# Patient Record
Sex: FEMALE | Race: WHITE | Employment: FULL TIME
[De-identification: names, ages, dates, MRNs, and addresses within clinical notes are randomized per-mention and may not be internally consistent; named-entity substitution may affect disease eponyms.]

---

## 2017-02-20 ENCOUNTER — RECORDS - HEALTHEAST (OUTPATIENT)
Dept: ADMINISTRATIVE | Facility: OTHER | Age: 52
End: 2017-02-20

## 2017-02-20 LAB
LAB AP CHARGES (HE HISTORICAL CONVERSION): NORMAL
PATH REPORT.COMMENTS IMP SPEC: NORMAL
PATH REPORT.FINAL DX SPEC: NORMAL
PATH REPORT.GROSS SPEC: NORMAL
PATH REPORT.MICROSCOPIC SPEC OTHER STN: NORMAL
PATH REPORT.RELEVANT HX SPEC: NORMAL
RESULT FLAG (HE HISTORICAL CONVERSION): NORMAL

## 2018-07-10 ENCOUNTER — HEALTH MAINTENANCE LETTER (OUTPATIENT)
Age: 53
End: 2018-07-10

## 2019-01-22 ENCOUNTER — PATIENT OUTREACH (OUTPATIENT)
Dept: CARE COORDINATION | Facility: CLINIC | Age: 54
End: 2019-01-22

## 2019-04-19 NOTE — TELEPHONE ENCOUNTER
RECORDS RECEIVED FROM: Scoliosis, no previous surgery   DATE RECEIVED: Apr 30, 2019    NOTES STATUS DETAILS   OFFICE NOTE from referring provider N/A    OFFICE NOTE from other specialist Care Everywhere PT Assessment 4/10/15   DISCHARGE SUMMARY from hospital N/A    DISCHARGE REPORT from the ER N/A    OPERATIVE REPORT N/A    MEDICATION LIST Care Everywhere    IMPLANT RECORD/STICKER N/A    LABS     CBC/DIFF N/A    CULTURES N/A    INJECTIONS DONE IN RADIOLOGY N/A    MRI N/A    CT SCAN N/A    XRAYS (IMAGES & REPORTS) N/A    TUMOR     PATHOLOGY  Slides & report N/A

## 2019-04-20 ASSESSMENT — ENCOUNTER SYMPTOMS
NECK PAIN: 0
BACK PAIN: 1
MUSCLE CRAMPS: 0
MUSCLE WEAKNESS: 0
BACK PAIN: 1
NECK PAIN: 0
BACK PAIN: 1
MUSCLE WEAKNESS: 0
ARTHRALGIAS: 0
ARTHRALGIAS: 0
JOINT SWELLING: 0
MUSCLE CRAMPS: 0
ARTHRALGIAS: 0
MUSCLE CRAMPS: 0
MYALGIAS: 1
MUSCLE WEAKNESS: 0
JOINT SWELLING: 0
ARTHRALGIAS: 0
MYALGIAS: 1
STIFFNESS: 0
MUSCLE WEAKNESS: 0
MYALGIAS: 1
STIFFNESS: 0
NECK PAIN: 0
JOINT SWELLING: 0
MUSCLE CRAMPS: 0
BACK PAIN: 1
STIFFNESS: 0
STIFFNESS: 0
NECK PAIN: 0
MYALGIAS: 1
JOINT SWELLING: 0

## 2019-04-20 ASSESSMENT — PATIENT HEALTH QUESTIONNAIRE - PHQ9
10. IF YOU CHECKED OFF ANY PROBLEMS, HOW DIFFICULT HAVE THESE PROBLEMS MADE IT FOR YOU TO DO YOUR WORK, TAKE CARE OF THINGS AT HOME, OR GET ALONG WITH OTHER PEOPLE: NOT DIFFICULT AT ALL
SUM OF ALL RESPONSES TO PHQ QUESTIONS 1-9: 5
SUM OF ALL RESPONSES TO PHQ QUESTIONS 1-9: 5
10. IF YOU CHECKED OFF ANY PROBLEMS, HOW DIFFICULT HAVE THESE PROBLEMS MADE IT FOR YOU TO DO YOUR WORK, TAKE CARE OF THINGS AT HOME, OR GET ALONG WITH OTHER PEOPLE: NOT DIFFICULT AT ALL
SUM OF ALL RESPONSES TO PHQ QUESTIONS 1-9: 5
SUM OF ALL RESPONSES TO PHQ QUESTIONS 1-9: 5

## 2019-04-20 ASSESSMENT — ANXIETY QUESTIONNAIRES
7. FEELING AFRAID AS IF SOMETHING AWFUL MIGHT HAPPEN: NOT AT ALL
GAD7 TOTAL SCORE: 3
7. FEELING AFRAID AS IF SOMETHING AWFUL MIGHT HAPPEN: NOT AT ALL
4. TROUBLE RELAXING: SEVERAL DAYS
7. FEELING AFRAID AS IF SOMETHING AWFUL MIGHT HAPPEN: NOT AT ALL
1. FEELING NERVOUS, ANXIOUS, OR ON EDGE: SEVERAL DAYS
2. NOT BEING ABLE TO STOP OR CONTROL WORRYING: NOT AT ALL
GAD7 TOTAL SCORE: 3
4. TROUBLE RELAXING: SEVERAL DAYS
7. FEELING AFRAID AS IF SOMETHING AWFUL MIGHT HAPPEN: NOT AT ALL
GAD7 TOTAL SCORE: 3
GAD7 TOTAL SCORE: 3
6. BECOMING EASILY ANNOYED OR IRRITABLE: SEVERAL DAYS
GAD7 TOTAL SCORE: 3
1. FEELING NERVOUS, ANXIOUS, OR ON EDGE: SEVERAL DAYS
3. WORRYING TOO MUCH ABOUT DIFFERENT THINGS: NOT AT ALL
6. BECOMING EASILY ANNOYED OR IRRITABLE: SEVERAL DAYS
3. WORRYING TOO MUCH ABOUT DIFFERENT THINGS: NOT AT ALL
5. BEING SO RESTLESS THAT IT IS HARD TO SIT STILL: NOT AT ALL
5. BEING SO RESTLESS THAT IT IS HARD TO SIT STILL: NOT AT ALL
2. NOT BEING ABLE TO STOP OR CONTROL WORRYING: NOT AT ALL
GAD7 TOTAL SCORE: 3

## 2019-04-21 ASSESSMENT — ANXIETY QUESTIONNAIRES
GAD7 TOTAL SCORE: 3
GAD7 TOTAL SCORE: 3

## 2019-04-21 ASSESSMENT — PATIENT HEALTH QUESTIONNAIRE - PHQ9
SUM OF ALL RESPONSES TO PHQ QUESTIONS 1-9: 5
SUM OF ALL RESPONSES TO PHQ QUESTIONS 1-9: 5

## 2019-04-22 ENCOUNTER — VIRTUAL VISIT (OUTPATIENT)
Dept: INTERNAL MEDICINE | Facility: CLINIC | Age: 54
End: 2019-04-22
Payer: COMMERCIAL

## 2019-04-22 DIAGNOSIS — Z00.00 HEALTH CARE MAINTENANCE: Primary | ICD-10-CM

## 2019-04-22 NOTE — PROGRESS NOTES
Health Maintenance:  Do you have a PCP? Yes  When was your last visit with your PCP? 3 years  When was your last eye exam? 7/2018  Have you ever had a colonoscopy? Yes   If yes, when? 2016  Have you ever had any polyps removed? No    30+ Pack Year Smoking History:  Have you ever had a chest CT to screen for cancer? No    If Female: (25 years old+) When was your last pap smear ?    Have you ever had abnormal pap smear results?     As part of your visit we will set up a DEXA scan which will measure your body composition. We have a few questions that need to be answered before we can schedule this scan:   What is your approximate weight? 110   Have you ever had a DEXA scan within the past 2 years? No   Will you have any other imaging studies with contrast (x-ray, CT scan) within 7  days of this appointment? No   Have you had any spine or hip surgery? No   Do you take any vitamins that contain calcium or antacids with calcium? No    If yes, stop taking 24 hours prior to visit.     Goals for the Visit:  1. Thorough Comprehensive Preventative Exam  2.Concerned about Iron level, has been an issue in the past  3. Lower back pain has been impacting her life in the past 3 years,   4. Sleep Concerns, has seen sleep med in the past    Pertinent past Medical/Family and Social HX:   Pertinent sx that desire are addressed with this visit:     Answers for HPI/ROS submitted by the patient on 4/20/2019   If you checked off any problems, how difficult have these problems made it for you to do your work, take care of things at home, or get along with other people?: Not difficult at all  PHQ9 TOTAL SCORE: 5  DESIREE 7 TOTAL SCORE: 3  General Symptoms: No  Skin Symptoms: No  HENT Symptoms: No  EYE SYMPTOMS: No  HEART SYMPTOMS: No  LUNG SYMPTOMS: No  INTESTINAL SYMPTOMS: No  URINARY SYMPTOMS: No  GYNECOLOGIC SYMPTOMS: No  BREAST SYMPTOMS: No  SKELETAL SYMPTOMS: Yes  BLOOD SYMPTOMS: No  NERVOUS SYSTEM SYMPTOMS: No  MENTAL HEALTH SYMPTOMS:  No  Back pain: Yes  Muscle aches: Yes  Neck pain: No  Swollen joints: No  Joint pain: No  Bone pain: No  Muscle cramps: No  Muscle weakness: No  Joint stiffness: No  Bone fracture: No      Instructions prior to appointment:   1. Fast beginning at 10 pm for lab appointment  2. If your preventive care assessment package includes a Fitness Assessment, please bring athletic shoes. Complementary [facility] fitness attire is provided and yours to keep.  3. If eye exam, eyes may be dilated, it will last 4-6 hours, may want to bring sunglasses.   4. May bring laptop or other work materials for use during downtime.   5. You will receive an email about 3 days prior to your visit with a final itinerary, menu selections for the complementary breakfast and lunch and instructions for the visit.     Complimentary  Parking provided. Drop off car in front of [facility], take the patient elevators to the [facility] clinic. When you enter in the lobby, identify yourself as an Executive Health [atient and you will be escorted up to the clinic.   If questions arise prior to your appointment please contact the clinic at [phone].

## 2019-04-23 DIAGNOSIS — M41.9 SCOLIOSIS: Primary | ICD-10-CM

## 2019-04-30 ENCOUNTER — OFFICE VISIT (OUTPATIENT)
Dept: AUDIOLOGY | Facility: CLINIC | Age: 54
End: 2019-04-30
Payer: COMMERCIAL

## 2019-04-30 ENCOUNTER — PRE VISIT (OUTPATIENT)
Dept: ORTHOPEDICS | Facility: CLINIC | Age: 54
End: 2019-04-30

## 2019-04-30 ENCOUNTER — OFFICE VISIT (OUTPATIENT)
Dept: INTERNAL MEDICINE | Facility: CLINIC | Age: 54
End: 2019-04-30
Payer: COMMERCIAL

## 2019-04-30 ENCOUNTER — OFFICE VISIT (OUTPATIENT)
Dept: GASTROENTEROLOGY | Facility: CLINIC | Age: 54
End: 2019-04-30
Payer: COMMERCIAL

## 2019-04-30 ENCOUNTER — OFFICE VISIT (OUTPATIENT)
Dept: DERMATOLOGY | Facility: CLINIC | Age: 54
End: 2019-04-30
Payer: COMMERCIAL

## 2019-04-30 ENCOUNTER — ANCILLARY PROCEDURE (OUTPATIENT)
Dept: BONE DENSITY | Facility: CLINIC | Age: 54
End: 2019-04-30
Payer: COMMERCIAL

## 2019-04-30 ENCOUNTER — OFFICE VISIT (OUTPATIENT)
Dept: ORTHOPEDICS | Facility: CLINIC | Age: 54
End: 2019-04-30
Payer: COMMERCIAL

## 2019-04-30 ENCOUNTER — ANCILLARY PROCEDURE (OUTPATIENT)
Dept: GENERAL RADIOLOGY | Facility: CLINIC | Age: 54
End: 2019-04-30
Attending: ORTHOPAEDIC SURGERY
Payer: COMMERCIAL

## 2019-04-30 ENCOUNTER — APPOINTMENT (OUTPATIENT)
Dept: INTERNAL MEDICINE | Facility: CLINIC | Age: 54
End: 2019-04-30
Payer: COMMERCIAL

## 2019-04-30 VITALS — HEIGHT: 59 IN | WEIGHT: 110 LBS | BODY MASS INDEX: 22.18 KG/M2

## 2019-04-30 VITALS
TEMPERATURE: 97.7 F | SYSTOLIC BLOOD PRESSURE: 117 MMHG | HEART RATE: 67 BPM | HEIGHT: 59 IN | DIASTOLIC BLOOD PRESSURE: 75 MMHG | WEIGHT: 110 LBS | RESPIRATION RATE: 16 BRPM | OXYGEN SATURATION: 99 % | BODY MASS INDEX: 22.18 KG/M2

## 2019-04-30 DIAGNOSIS — M41.20 OTHER IDIOPATHIC SCOLIOSIS, UNSPECIFIED SPINAL REGION: ICD-10-CM

## 2019-04-30 DIAGNOSIS — Z71.84 TRAVEL ADVICE ENCOUNTER: ICD-10-CM

## 2019-04-30 DIAGNOSIS — Z00.00 ROUTINE GENERAL MEDICAL EXAMINATION AT A HEALTH CARE FACILITY: Primary | ICD-10-CM

## 2019-04-30 DIAGNOSIS — Z01.10 ENCOUNTER FOR EXAMINATION OF EARS AND HEARING WITHOUT ABNORMAL FINDINGS: Primary | ICD-10-CM

## 2019-04-30 DIAGNOSIS — Z71.3 NUTRITIONAL COUNSELING: Primary | ICD-10-CM

## 2019-04-30 DIAGNOSIS — Z00.00 HEALTHCARE MAINTENANCE: Primary | ICD-10-CM

## 2019-04-30 DIAGNOSIS — F51.04 INSOMNIA, PSYCHOPHYSIOLOGICAL: ICD-10-CM

## 2019-04-30 DIAGNOSIS — D23.9 DERMATOFIBROMA: ICD-10-CM

## 2019-04-30 DIAGNOSIS — Z00.00 ENCOUNTER FOR PREVENTIVE HEALTH EXAMINATION: Primary | ICD-10-CM

## 2019-04-30 DIAGNOSIS — Z12.83 SKIN CANCER SCREENING: Primary | ICD-10-CM

## 2019-04-30 DIAGNOSIS — Z71.82 EXERCISE COUNSELING: Primary | ICD-10-CM

## 2019-04-30 DIAGNOSIS — M41.25 OTHER IDIOPATHIC SCOLIOSIS, THORACOLUMBAR REGION: Primary | ICD-10-CM

## 2019-04-30 DIAGNOSIS — D50.0 IRON DEFICIENCY ANEMIA DUE TO CHRONIC BLOOD LOSS: ICD-10-CM

## 2019-04-30 DIAGNOSIS — Z00.00 VISIT FOR PREVENTIVE HEALTH EXAMINATION: ICD-10-CM

## 2019-04-30 LAB
ALBUMIN UR-MCNC: NEGATIVE MG/DL
APPEARANCE UR: ABNORMAL
BASOPHILS # BLD AUTO: 0.1 10E9/L (ref 0–0.2)
BASOPHILS NFR BLD AUTO: 1.4 %
BILIRUB UR QL STRIP: NEGATIVE
CHOLEST SERPL-MCNC: 206 MG/DL
COLOR UR AUTO: YELLOW
CREAT SERPL-MCNC: 0.7 MG/DL (ref 0.52–1.04)
DEPRECATED CALCIDIOL+CALCIFEROL SERPL-MC: 49 UG/L (ref 20–75)
DIFFERENTIAL METHOD BLD: NORMAL
EOSINOPHIL # BLD AUTO: 0.1 10E9/L (ref 0–0.7)
EOSINOPHIL NFR BLD AUTO: 2.6 %
ERYTHROCYTE [DISTWIDTH] IN BLOOD BY AUTOMATED COUNT: 13.3 % (ref 10–15)
GFR SERPL CREATININE-BSD FRML MDRD: >90 ML/MIN/{1.73_M2}
GLUCOSE UR STRIP-MCNC: NEGATIVE MG/DL
HBV SURFACE AB SERPL IA-ACNC: 0.98 M[IU]/ML
HCT VFR BLD AUTO: 40.1 % (ref 35–47)
HDLC SERPL-MCNC: 91 MG/DL
HGB BLD-MCNC: 13.2 G/DL (ref 11.7–15.7)
HGB UR QL STRIP: NEGATIVE
HIV 1+2 AB+HIV1 P24 AG SERPL QL IA: NONREACTIVE
IMM GRANULOCYTES # BLD: 0 10E9/L (ref 0–0.4)
IMM GRANULOCYTES NFR BLD: 0.2 %
IRON SATN MFR SERPL: 35 % (ref 15–46)
IRON SERPL-MCNC: 111 UG/DL (ref 35–180)
KETONES UR STRIP-MCNC: NEGATIVE MG/DL
LDLC SERPL CALC-MCNC: 97 MG/DL
LEUKOCYTE ESTERASE UR QL STRIP: NEGATIVE
LYMPHOCYTES # BLD AUTO: 1.6 10E9/L (ref 0.8–5.3)
LYMPHOCYTES NFR BLD AUTO: 38.4 %
MCH RBC QN AUTO: 33 PG (ref 26.5–33)
MCHC RBC AUTO-ENTMCNC: 32.9 G/DL (ref 31.5–36.5)
MCV RBC AUTO: 100 FL (ref 78–100)
MONOCYTES # BLD AUTO: 0.4 10E9/L (ref 0–1.3)
MONOCYTES NFR BLD AUTO: 9.5 %
MUCOUS THREADS #/AREA URNS LPF: PRESENT /LPF
NEUTROPHILS # BLD AUTO: 2 10E9/L (ref 1.6–8.3)
NEUTROPHILS NFR BLD AUTO: 47.9 %
NITRATE UR QL: NEGATIVE
NONHDLC SERPL-MCNC: 115 MG/DL
NRBC # BLD AUTO: 0 10*3/UL
NRBC BLD AUTO-RTO: 0 /100
PH UR STRIP: 5 PH (ref 5–7)
PLATELET # BLD AUTO: 278 10E9/L (ref 150–450)
RBC # BLD AUTO: 4 10E12/L (ref 3.8–5.2)
RBC #/AREA URNS AUTO: <1 /HPF (ref 0–2)
SOURCE: ABNORMAL
SP GR UR STRIP: 1.01 (ref 1–1.03)
SQUAMOUS #/AREA URNS AUTO: 3 /HPF (ref 0–1)
TIBC SERPL-MCNC: 317 UG/DL (ref 240–430)
TRIGL SERPL-MCNC: 87 MG/DL
TSH SERPL DL<=0.005 MIU/L-ACNC: 3.15 MU/L (ref 0.4–4)
UROBILINOGEN UR STRIP-MCNC: 0 MG/DL (ref 0–2)
WBC # BLD AUTO: 4.2 10E9/L (ref 4–11)
WBC #/AREA URNS AUTO: 1 /HPF (ref 0–5)

## 2019-04-30 RX ORDER — LORAZEPAM 0.5 MG/1
.25-.5 TABLET ORAL
Qty: 10 TABLET | Refills: 0 | Status: SHIPPED | OUTPATIENT
Start: 2019-04-30

## 2019-04-30 ASSESSMENT — MIFFLIN-ST. JEOR
SCORE: 1004.59
SCORE: 1004.59

## 2019-04-30 ASSESSMENT — PAIN SCALES - GENERAL
PAINLEVEL: MILD PAIN (2)
PAINLEVEL: NO PAIN (0)

## 2019-04-30 NOTE — PATIENT INSTRUCTIONS
It was nice meeting you today:    1. Increase water/herbal tea/fruit/vegetable infused water to drink at least 6-8 cups (48 oz-64 oz) per day.  2. Limit drinking lemonade at dinner (can have lemon infused water. Can try true lemon or true lime flavoring).  3. Consistently eat a balanced breakfast meals. Have a fruit and/or vegetable serving at breakfast and lunch each day.  4. Can try Bariatric Advantage calcium chewy bites. (discount code if ordering on their company site is [employer]). Otherwise can purchase on [employer]. Centrum also have a chewy calcium as well or citrucal has petite calcium pills.  5. Include 1-2 calcium rich foods (such as yogurt, fortified almond milk) each day.  6. Follow exercise specialist's recommendations for exercise and getting more steps in each day.  7. Practice more mindful eating (and avoid distractions while eating such as computers, tv, phone, working).    Lucía Burger, MS, RD, LD

## 2019-04-30 NOTE — PROGRESS NOTES
Ms. Iraheta is a 53 year old female here for Executive Physical.    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 Overall, she is quite healthy. She had a few concerns which we reviewed today:    1. Sleep Maintenance Insomnia: Jennifer has difficulty staying asleep. She typically is quite tired and falls asleep immediately around 11:30 when she goes to bed. She often wakes up at 3-4 am.  She finds her mind thinking about things. She does good thinking and planning early in the AM. Sometimes she will go back to bed after.  She is not overly tired during the daytime and is able to function, but wishes she could get more sleep. She denies pain, restless legs, snoring or urinary frequency. She takes 5 mg of melatonin right before bed.  She tried low dose lorazepam and did not have adverse effects. She has also tried trazodone but did not like how it made her feel. She saw sleep psychology previously. She doesn't prefer to stay on a medication long term.    2. Low Back Pain: She has a history of scoliosis.  Does yoga and walking at home.  Will see orthopedics today for further evaluation.    3. Travel: Upcoming trip to [facility] in June for just over a week.  Will be in [facility] and [place]  Will be traveling to a [place].  She previously went to [place] and has had typhoid and hep A vaccinations. She is not sure about Hep B.  Overall, she is curious about her risk for illness and is hesitant to receive vaccinations or take malaria prophylaxis.  We did review the CDC travelers recommendations.    4. Anemia/Iron deficiency: Previously related to menorrhagia. Had an endometrial ablation in 2/17 and endometrial biopsy which was benign. She had her last menses in 9/18 and some spotting in January.  Sees an outside Gyn.  Reports having normal paps and had a pap last year.  Has had 3 pregnancies and 2  vaginal births.  No family history of gyn malignancy or breast cancer.    Answers for HPI/ROS submitted by the patient on 4/20/2019   If you checked off any problems, how difficult have these problems made it for you to do your work, take care of things at home, or get along with other people?: Not difficult at all  PHQ9 TOTAL SCORE: 5  DESIREE 7 TOTAL SCORE: 3  General Symptoms: No  Skin Symptoms: No  HENT Symptoms: No  EYE SYMPTOMS: No  HEART SYMPTOMS: No  LUNG SYMPTOMS: No  INTESTINAL SYMPTOMS: No  URINARY SYMPTOMS: No  GYNECOLOGIC SYMPTOMS: No  BREAST SYMPTOMS: No  SKELETAL SYMPTOMS: Yes  BLOOD SYMPTOMS: No  NERVOUS SYSTEM SYMPTOMS: No  MENTAL HEALTH SYMPTOMS: No  Back pain: Yes  Muscle aches: Yes  Neck pain: No  Swollen joints: No  Joint pain: No  Bone pain: No  Muscle cramps: No  Muscle weakness: No  Joint stiffness: No  Bone fracture: No      A full 10-pt Review of Systems was performed, verified and is negative except as documented in the HPI.  All health questionnaires were reviewed, verified and relevant information documented above.    Past Medical History:  Past Medical History:   Diagnosis Date     Anemia      Cataract congenital    learned about cataract when I was a child     Menorrhagia      Scoliosis     incresing back pain has tried multiple methods for strengthening and relief  - eg yoga       Past Surgical History:  Past Surgical History:   Procedure Laterality Date     COLONOSCOPY N/A 2017    no findings     HC HYSTEROSCOPY, SURGICAL; W/ ENDOMETRIAL ABLATION, ANY METHOD       HERNIA REPAIR  1972?    double hernia repair       Active Meds:  Current Outpatient Medications   Medication     calcium carbonate (OS-BRENNA) 500 MG tablet     cholecalciferol ( ULTRA STRENGTH) 2000 units CAPS     LORazepam (ATIVAN) 0.5 MG tablet     multivitamin w/minerals (THERA-VIT-M) tablet     Vitamin D, Cholecalciferol, 1000 units TABS     No current facility-administered medications for this visit.      "    Allergies:  Patient has no known allergies.    Family History:  family history includes Asthma in her sister; Atrial fibrillation in her mother; Attention Deficit Disorder in her son; Bone Cancer in her maternal grandmother; GERD in her brother; Hiatal hernia in her brother; Hypertension in her father; Myocardial Infarction (age of onset: 40) in her sister; Skin Cancer in her mother; Thrombosis in her paternal half-brother.    Social History:  Social History     Tobacco Use     Smoking status: Never Smoker     Smokeless tobacco: Never Used   Substance Use Topics     Alcohol use: Yes     Comment: Rarely, glass of wine twice per month on average     Drug use: No    for 30 years to ,   2 sons Oliver and Kelsey, 21 yo and 25 yo finishing college  Niece lives with her  2 cats, enjoys reading, walking, medication, yoga, cooking  Active in [employer]    Physical Exam:  Vitals: /75   Pulse 67   Temp 97.7  F (36.5  C) (Oral)   Resp 16   Ht 1.499 m (4' 11\")   Wt 49.9 kg (110 lb)   SpO2 99%   BMI 22.22 kg/m    Constitutional: Alert, oriented, pleasant, no acute distress  Head: Normocephalic, atraumatic  Eyes: Extra-ocular movements intact, pupils equally round and reactive bilaterally, no scleral icterus  ENT: Oropharynx clear, moist mucus membranes, good dentition  Neck: Supple, no lymphadenopathy, no thyromegaly  Cardiovascular: Regular rate and rhythm, no murmurs, rubs or gallops, peripheral pulses full/symmetric  Respiratory: Good air movement bilaterally, lungs clear, no wheezes/rales/rhonchi  Breasts: normal without suspicious masses, skin changes or axillary nodes, symmetric fibrous changes bilaterally around 6 o'clock  GI: Abdomen soft, bowel sounds present, nondistended, nontender, no organomegaly or masses, no rebound/guarding  Musculoskeletal: No edema, normal muscle tone, normal gait  Neurologic: Alert and oriented, cranial nerves 2-12 intact, strength 5/5 throughout, " sensation to light touch intact, reflexes 2+ bilaterally  Psychiatric: normal mentation, affect and mood    EKG: NSR, no acute changes, normal intervals      Assessment and Plan:  Jennifer was seen today for physical.    Diagnoses and all orders for this visit:    Visit for preventive health examination  Preventative health reviewed and updated, all available results reviewed/discussed during visit.  -     Cancel: *UA reflex to Microscopic  -     NUTRITION REFERRAL [9020]  -     HIV Antigen Antibody Combo [[id number]]  -     DX Hip/Pelvis/Spine  -     EKG 12-lead, tracing only  -     Cancel: PFT General Lab Testing  -     Cancel: VO2Max Fitness Assessment  -     Lipid panel reflex to direct LDL Fasting  -     Vitamin D Deficiency  -     TSH  -     Cancel: Glucose  -     Creatinine  -     Cancel: Alkaline phosphatase  -     Cancel: ALT  -     CBC with platelets differential  -     Iron and iron binding capacity; Future  -     Iron and iron binding capacity  -     ALT; Future  -     Alkaline phosphatase; Future  -     Glucose; Future  -     VO2Max Fitness Assessment; Future  -     PFT General Lab Testing; Future  -     PFT General Lab Testing  -     VO2Max Fitness Assessment  -     Cancel: Glucose  -     Cancel: Alkaline phosphatase  -     Cancel: ALT    Scoliosis  Will see ortho today.  -     XR Spine Complete Scoliosis 2 Views    Travel advice encounter  Travel to [facility] in [place], urban and [place] areas. Reviewed in detail with patient and discussed rationale/risks/benefits for vaccinations and malaria chemoprophylaxis.  Advised we do not have Yellow Fever vaccination but advised she should consider typhoid and meningococcal today and schedule f/u with Travel Clinic. She elected to review this further and declined vaccinations today.  -     Hepatitis B Surface Antibody; Future    Insomnia, psychophysiological  Discussed strategies for sleep maintenance.  Suggested taking melatonin earlier. Suggested trial of OTC medication  such as doxylamine.  Lorazepam to be reserved for travel or recalcitrant insomnia.  -     LORazepam (ATIVAN) 0.5 MG tablet; Take 0.5-1 tablets (0.25-0.5 mg) by mouth nightly as needed for sleep    Iron deficiency anemia due to chronic blood loss  Likely 2/2 menorrhagia, resolved with ablation.  Her iron today is normal.  Provided reassurance.        #Routine Health Maintenence:  Immunizations (zoster, pneumovax, flu, Tdap, Hep A/B):   Most Recent Immunizations   Administered Date(s) Administered     None   Pended Date(s) Pended     Meningococcal (Menveo ) 04/30/2019     Typhoid IM 04/30/2019     Lipids:   Recent Labs   Lab Test 04/30/19  0712   CHOL 206*   HDL 91   LDL 97   TRIG 87     Lung Ca Screening (>30 pk age 55-79 or >20 py age 50-79 + RF): n/a non smoker  Colonoscopy (50-75 yrs): 5/16, diverticulosis, f/u 10 years  Dexa (>65W or 70M yrs): done previously at [facility] and today, results pending  Mammogram (40-75 yrs): 11/18 [facility] normal  Pap (21-65 yrs): 2018 per patient report, results unavailable  HIV/HCV if risk factors: HCV neg 12/14  Safety/Lifestyle: reviewed  Tob/EtOH: reviewed  Depression: PHQ-2 Score:     No flowsheet data found.    Advanced Directive: not discussed    Return to clinic: adelaida Molina MD  Internal Medicine

## 2019-04-30 NOTE — LETTER
"May 9, 2019      Jennifer Iraheta  [address] [facility] [address]              Dear Jennifer,     It was a pleasure to meet you at your recent Executive Health visit. We hope your visit met your expectations. I would like to review the following pertinent findings from your visit.     Your blood work looked excellent. Your creatinine, or kidney function, was normal. Your liver function tests were also normal.  Your TSH, or thyroid function, was normal at 3.15. A vitamin D level was 49, which is within normal range. A complete blood count and iron panel was normal; you do not have anemia.     Your cholesterol panel was notable for an elevated HDL level, or \"good\" cholesterol, of 91 which is excellent.  Your LDL, or \"bad\" cholesterol, was 97 and the triglycerides were 87. These numbers are all within acceptable limits.  You do not need to be on a cholesterol medication at this time. I would like to emphasize the importance of a healthy lifestyle, including regular cardiovascular exercise at least 3-4 times per week, strength training, getting adequate sleep and minimizing stress, all which help maintain a healthy weight and numbers.  A diet lower in carbohydrates and higher in healthy fats and proteins may be more likely to help you reduce cholesterol and maintain a healthy weight.  Adhering to safe drinking recommendations of no more than 7 alcoholic drinks per week for women will help prevent cardiovascular disease and some types of cancer.     Your bone density screening revealed that you have osteoporosis in the spine and we typically recommend considering a medication to improve bone strength and reduce the risk of fractures. If you do not eat 3-4 servings of calcium daily, it would be beneficial to take a calcium and vitamin D supplement to maintain bone strength. Examples of calcium rich foods include dairy (milk, yogurt, cheese), greens (kale, bok owen), broccoli, oranges, seafood (sardines, shrimp, " salmon) or fortified foods (almond milk, cereals, tofu).  Aim for 250-500 mg calcium and 400-1000 vitamin D per day, more or less depending on your dietary intake.  Weight bearing exercise such as walking, running, dance, etc. helps maintain bone strength as well.     Your hearing screen was largely normal. Your breathing tests, or pulmonary function tests, were normal.      Your blood pressure during your visit was averaged at 124/77, which is great. Your pulse was 67 and your body mass index was 22 (normal is 18-24.9).  You had an EKG on your heart, which was showed a normal cardiac rhythm, rate and no signs of cardiac injury.     We did review your preventative health. You had a mammogram at an outside institution in November of 2018.  For low risk individuals, we do recommend mammography screening every 12-24 months between ages 40 and 75 years. You reported having a pap smear in 2018 which was unremarkable.  An HIV screening test was also negative.  You were up to date on vaccinations.  It is recommended to get a flu shot annually in the fall.     We also reviewed a few health concerns. We did discuss your upcoming travels to [facility]. Based on current guidelines, I would recommend that you consider getting the Hepatitis B vaccination, typhoid vaccination, meningococcal vaccination and Yellow Fever vaccination.  I would also recommend malaria prophylaxis while you are there. You may schedule another visit with myself or a Travel Clinic to discuss this further.       Jennifer I enjoyed visiting with you at your recent appointment.  Best wishes for a productive and healthy year for you and an enjoyable summer with your sons. Thank you for choosing [facility] for your medical needs. Please do not hesitate to contact me with any questions regarding these results or recommendations, or if you chose to pursue [facility] for your ongoing health care needs in the future.           Sincerely,         Marie Molina MD FACP  [facility] and Primary Care  Division of General Internal Medicine  [facility]

## 2019-04-30 NOTE — PROGRESS NOTES
AUDIOLOGY REPORT    [facility] Visit    SUMMARY: Audiology visit completed. See audiogram for results.      RECOMMENDATIONS: Follow-up with care team. Return to monitor hearing if changes noted or new ear symptoms arise. ENT referral warranted if asymmetry increases.    Umang Kaye.  Licensed Audiologist  [place] # [id number]

## 2019-04-30 NOTE — PATIENT INSTRUCTIONS
Sun protective clothing and Resources     Lands End ([email or web address])  Athleta ([email or web address])  Yimi Life ([email or web address])  Carve Designs (Conjur) - affordable  Skinz ([email or web address])    Long sleeve - Tegan Cool DRI UPF 50 or Highland PFG UPF 50  Hoodie - Highland PFG UPF 50  Swimshirt/Rash Guard - Luli UPF 50 (on Amazon)  Neck - Outdoor Research Ubertubes ([email or web address])

## 2019-04-30 NOTE — NURSING NOTE
----- Message from Vikram Hernandez MD sent at 5/30/2018 12:57 PM EDT -----  Regarding: tests  Schedule a right upper quadrant ultrasound and HIDA scan with [facility]   Dermatology Rooming Note    Jennifer Iraheta's goals for this visit include:   Chief Complaint   Patient presents with     Skin Check     Jennifer is here today for a skin check- Jennifer notes no areas of concern.      KAYE Conrad

## 2019-04-30 NOTE — LETTER
4/30/2019       RE: Jennifer Iraheta  [address] [facility] [address]     Dear Colleague,    Thank you for referring your patient, Jennifer Iraheta, to the [facility] at [facility]. Please see a copy of my visit note below.    [facility] Dermatology Note      Dermatology Problem List:  1.Skin Examination: 4/30/2019    Encounter Date: Apr 30, 2019    CC:  Chief Complaint   Patient presents with     Skin Check     Jennifer is here today for a skin check- Jennifer notes no areas of concern.          History of Present Illness:  Ms. Jennifer Iraheta is a 53 year old female who is new to the dermatology clinic present for a FBSE. Today the patient reports no areas of concern. Patient does not have a history of skin cancer, but does have a family history of skin cancer (Mother and Aunt, she is unsure of what type of skin cancer). At a young age patient denied applying sun screen, but as she got older she start applying sun screen regularly. The patient denies painful, itching, tingling or bleeding lesions unless otherwise noted.    Past Medical History:   There is no problem list on file for this patient.    Past Medical History:   Diagnosis Date     Cataract congenital    learned about cataract when I was a child     Scoliosis     incresing back pain has tried multiple methods for strengthening and relief  - eg yoga     Past Surgical History:   Procedure Laterality Date     COLONOSCOPY N/A 2017    no findings     HERNIA REPAIR  1972?    double hernia repair       Social History:   reports that she has never smoked. She has never used smokeless tobacco. She reports that she drinks alcohol. She reports that she does not use drugs.    Family History:  Family History   Problem Relation Age of Onset     Bone Cancer Maternal Grandmother         Passed away from cancer of the bone marrow     Skin Cancer Mother         located on her leg     Myocardial  Infarction Sister         Emergency triple bypass (catheter induced)     Asthma Sister      Attention Deficit Disorder Son         Inattentive ADD     Hypertension Father      Thrombosis Paternal Half-Brother        Medications:  Current Outpatient Medications   Medication Sig Dispense Refill     calcium carbonate (OS-BRENNA) 500 MG tablet Take 1 tablet by mouth 2 times daily       cholecalciferol ( ULTRA STRENGTH) 2000 units CAPS Take 2,000 Units by mouth       multivitamin w/minerals (THERA-VIT-M) tablet Take 1 tablet by mouth daily       Vitamin D, Cholecalciferol, 1000 units TABS Take 1 tablet by mouth         No Known Allergies    Review of Systems:  -Constitutional: The patient denies fatigue, fevers, chills, unintended weight loss, and night sweats.  -HEENT: Patient denies nonhealing oral sores.  -Skin: As above in HPI. No additional skin concerns.    Physical exam:  Vitals: There were no vitals taken for this visit.  GEN: This is a well developed, well-nourished female in no acute distress, in a pleasant mood.    SKIN: Full skin, which includes the head/face, both arms, chest, back, abdomen,both legs, genitalia and/or groin buttocks, digits and/or nails, was examined.  -Pritchard's skin type II, less than 100 Nevi   -There is a firm tan/flesh colored papule that dimples with lateral pressure on the right upper thigh.  -No other lesions of concern on areas examined.       Impression/Plan:  1. Skin examination     Educated the patient on the various Skin Cancers     ABCDs of melanoma were discussed and self skin checks were advised.     Sun precaution was advised including the use of sun screens of SPF 30 or higher, sun protective clothing, and avoidance of tanning beds.    Yearly skin examination due to the fact that the patient is above 50    2. Dermatofibroma    Reassured Benign, No further intervention required. Patient to report changes.     CC Dr. Nguyễn on close of this encounter.  Follow-up in 1  year, earlier for new or changing lesions.       Staff Involved:  Staff/Scribe    Scribe Disclosure:  I, Radha Mcallister, am serving as a scribe to document services personally performed by Eva Atkinson PA-C, based on data collection and the provider's statements to me.     Provider Disclosure:   The documentation recorded by the scribe accurately reflects the services I personally performed and the decisions made by me.    All risks, benefits and alternatives were discussed with patient.  Patient is in agreement and understands the assessment and plan.  All questions were answered.    Eva Atkinson PA-C  [facility]: Phone: [phone], Fax: [phone]

## 2019-04-30 NOTE — PROGRESS NOTES
Jennifer Iraheta comes into clinic today at the request of Dr. REN Molina Ordering Provider for EKG.    This service provided today was under the supervising provider of the day Dr. REN Molina, who was available if needed.    Мария Kelly

## 2019-04-30 NOTE — LETTER
4/30/2019    RE: Jennifer Iraheta  [address] [facility] [address]     Ms. Iraheta is a 53 year old female here for Executive Physical.    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 Overall, she is quite healthy. She had a few concerns which we reviewed today:    1. Sleep Maintenance Insomnia: Jennifer has difficulty staying asleep. She typically is quite tired and falls asleep immediately around 11:30 when she goes to bed. She often wakes up at 3-4 am.  She finds her mind thinking about things. She does good thinking and planning early in the AM. Sometimes she will go back to bed after.  She is not overly tired during the daytime and is able to function, but wishes she could get more sleep. She denies pain, restless legs, snoring or urinary frequency. She takes 5 mg of melatonin right before bed.  She tried low dose lorazepam and did not have adverse effects. She has also tried trazodone but did not like how it made her feel. She saw sleep psychology previously. She doesn't prefer to stay on a medication long term.    2. Low Back Pain: She has a history of scoliosis.  Does yoga and walking at home.  Will see orthopedics today for further evaluation.    3. Travel: Upcoming trip to [facility] in June for just over a week.  Will be in [facility] and [place]  Will be traveling to a University.  She previously went to [place] and has had typhoid and hep A vaccinations. She is not sure about Hep B.  Overall, she is curious about her risk for illness and is hesitant to receive vaccinations or take malaria prophylaxis.  We did review the [facility] travelers recommendations.    4. Anemia/Iron deficiency: Previously related to menorrhagia. Had an endometrial ablation in 2/17 and endometrial biopsy which was benign. She had her last menses in 9/18 and some spotting in January.  Sees an outside Gyn.   Reports having normal paps and had a pap last year.  Has had 3 pregnancies and 2 vaginal births.  No family history of gyn malignancy or breast cancer.    Answers for HPI/ROS submitted by the patient on 4/20/2019   If you checked off any problems, how difficult have these problems made it for you to do your work, take care of things at home, or get along with other people?: Not difficult at all  PHQ9 TOTAL SCORE: 5  DESIREE 7 TOTAL SCORE: 3  General Symptoms: No  Skin Symptoms: No  HENT Symptoms: No  EYE SYMPTOMS: No  HEART SYMPTOMS: No  LUNG SYMPTOMS: No  INTESTINAL SYMPTOMS: No  URINARY SYMPTOMS: No  GYNECOLOGIC SYMPTOMS: No  BREAST SYMPTOMS: No  SKELETAL SYMPTOMS: Yes  BLOOD SYMPTOMS: No  NERVOUS SYSTEM SYMPTOMS: No  MENTAL HEALTH SYMPTOMS: No  Back pain: Yes  Muscle aches: Yes  Neck pain: No  Swollen joints: No  Joint pain: No  Bone pain: No  Muscle cramps: No  Muscle weakness: No  Joint stiffness: No  Bone fracture: No      A full 10-pt Review of Systems was performed, verified and is negative except as documented in the HPI.  All health questionnaires were reviewed, verified and relevant information documented above.    Past Medical History:  Past Medical History:   Diagnosis Date     Anemia      Cataract congenital    learned about cataract when I was a child     Menorrhagia      Scoliosis     incresing back pain has tried multiple methods for strengthening and relief  - eg yoga       Past Surgical History:  Past Surgical History:   Procedure Laterality Date     COLONOSCOPY N/A 2017    no findings     HC HYSTEROSCOPY, SURGICAL; W/ ENDOMETRIAL ABLATION, ANY METHOD       HERNIA REPAIR  1972?    double hernia repair       Active Meds:  Current Outpatient Medications   Medication     calcium carbonate (OS-BRENNA) 500 MG tablet     cholecalciferol ( ULTRA STRENGTH) 2000 units CAPS     LORazepam (ATIVAN) 0.5 MG tablet     multivitamin w/minerals (THERA-VIT-M) tablet     Vitamin D, Cholecalciferol, 1000 units TABS  "    No current facility-administered medications for this visit.         Allergies:  Patient has no known allergies.    Family History:  family history includes Asthma in her sister; Atrial fibrillation in her mother; Attention Deficit Disorder in her son; Bone Cancer in her maternal grandmother; GERD in her brother; Hiatal hernia in her brother; Hypertension in her father; Myocardial Infarction (age of onset: 40) in her sister; Skin Cancer in her mother; Thrombosis in her paternal half-brother.    Social History:  Social History     Tobacco Use     Smoking status: Never Smoker     Smokeless tobacco: Never Used   Substance Use Topics     Alcohol use: Yes     Comment: Rarely, glass of wine twice per month on average     Drug use: No    for 30 years to ,   2 sons Oliver and Kelsey, 21 yo and 23 yo finishing college  Niece lives with her  2 cats, enjoys reading, walking, medication, yoga, cooking  Active in [employer]    Physical Exam:  Vitals: /75   Pulse 67   Temp 97.7  F (36.5  C) (Oral)   Resp 16   Ht 1.499 m (4' 11\")   Wt 49.9 kg (110 lb)   SpO2 99%   BMI 22.22 kg/m     Constitutional: Alert, oriented, pleasant, no acute distress  Head: Normocephalic, atraumatic  Eyes: Extra-ocular movements intact, pupils equally round and reactive bilaterally, no scleral icterus  ENT: Oropharynx clear, moist mucus membranes, good dentition  Neck: Supple, no lymphadenopathy, no thyromegaly  Cardiovascular: Regular rate and rhythm, no murmurs, rubs or gallops, peripheral pulses full/symmetric  Respiratory: Good air movement bilaterally, lungs clear, no wheezes/rales/rhonchi  Breasts: normal without suspicious masses, skin changes or axillary nodes, symmetric fibrous changes bilaterally around 6 o'clock  GI: Abdomen soft, bowel sounds present, nondistended, nontender, no organomegaly or masses, no rebound/guarding  Musculoskeletal: No edema, normal muscle tone, normal gait  Neurologic: Alert and " oriented, cranial nerves 2-12 intact, strength 5/5 throughout, sensation to light touch intact, reflexes 2+ bilaterally  Psychiatric: normal mentation, affect and mood    EKG: NSR, no acute changes, normal intervals      Assessment and Plan:  Jennifer was seen today for physical.    Diagnoses and all orders for this visit:    Visit for preventive health examination  Preventative health reviewed and updated, all available results reviewed/discussed during visit.  -     Cancel: *UA reflex to Microscopic  -     NUTRITION REFERRAL [8420]  -     HIV Antigen Antibody Combo [[id number]]  -     DX Hip/Pelvis/Spine  -     EKG 12-lead, tracing only  -     Cancel: PFT General Lab Testing  -     Cancel: VO2Max Fitness Assessment  -     Lipid panel reflex to direct LDL Fasting  -     Vitamin D Deficiency  -     TSH  -     Cancel: Glucose  -     Creatinine  -     Cancel: Alkaline phosphatase  -     Cancel: ALT  -     CBC with platelets differential  -     Iron and iron binding capacity; Future  -     Iron and iron binding capacity  -     ALT; Future  -     Alkaline phosphatase; Future  -     Glucose; Future  -     VO2Max Fitness Assessment; Future  -     PFT General Lab Testing; Future  -     PFT General Lab Testing  -     VO2Max Fitness Assessment  -     Cancel: Glucose  -     Cancel: Alkaline phosphatase  -     Cancel: ALT    Scoliosis  Will see ortho today.  -     XR Spine Complete Scoliosis 2 Views    Travel advice encounter  Travel to [facility] in June, urban and [place] areas. Reviewed in detail with patient and discussed rationale/risks/benefits for vaccinations and malaria chemoprophylaxis.  Advised we do not have Yellow Fever vaccination but advised she should consider typhoid and meningococcal today and schedule f/u with Travel [facility]. She elected to review this further and declined vaccinations today.  -     Hepatitis B Surface Antibody; Future    Insomnia, psychophysiological  Discussed strategies for sleep maintenance.   Suggested taking melatonin earlier. Suggested trial of OTC medication such as doxylamine.  Lorazepam to be reserved for travel or recalcitrant insomnia.  -     LORazepam (ATIVAN) 0.5 MG tablet; Take 0.5-1 tablets (0.25-0.5 mg) by mouth nightly as needed for sleep    Iron deficiency anemia due to chronic blood loss  Likely 2/2 menorrhagia, resolved with ablation.  Her iron today is normal.  Provided reassurance.        #Routine Health Maintenence:  Immunizations (zoster, pneumovax, flu, Tdap, Hep A/B):   Most Recent Immunizations   Administered Date(s) Administered     None   Pended Date(s) Pended     Meningococcal (Menveo ) 04/30/2019     Typhoid IM 04/30/2019     Lipids:   Recent Labs   Lab Test 04/30/19  0712   CHOL 206*   HDL 91   LDL 97   TRIG 87     Lung Ca Screening (>30 pk age 55-79 or >20 py age 50-79 + RF): n/a non smoker  Colonoscopy (50-75 yrs): 5/16, diverticulosis, f/u 10 years  Dexa (>65W or 70M yrs): done previously at [place] and today, results pending  Mammogram (40-75 yrs): 11/18 [facility] normal  Pap (21-65 yrs): 2018 per patient report, results unavailable  HIV/HCV if risk factors: HCV neg 12/14  Safety/Lifestyle: reviewed  Tob/EtOH: reviewed  Depression: PHQ-2 Score:     No flowsheet data found.    Advanced Directive: not discussed    Return to clinic: adelaida Molina MD  Internal Medicine

## 2019-04-30 NOTE — PROGRESS NOTES
"[facility] Outpatient Medical Nutrition Therapy      Time Spent:  60 minutes  Session Type:  Individual Session  Referral Source: [employer]/Dr. Marie Molina  Reason for RD Visit:   Nutritional counseling     Nutrition Assessment:  Patient is here for annual visit with Registered Dietitian (LILY).  Patient is a 53 y.o. female with history of sciolosis and cataracts.  Patient stated that she is interested in finding out about some calcium rich/containing foods due to history of osteopenia.  She stated that in general she eats a healthy diet but feels she could eat some more fruits and vegetables and does not drink a lot of fluids especially does not drink a lot of water.  She likes to cook but due to her busy schedule does not have a lot of time to cook.  Her  has been cooking dinner meals.  She works until 6pm in the evening and then visits her mother on her way home from work.  Therefore, she eats dinner at about 7:30 in the evening.  She stated that her usual weight since middle school has been about 105 pounds she reported some weight gain to 110 pounds, so she does not want to continue to gain weight and wants to be more mindful of watching her weight and habits that could cause weight gain.    Height:   Ht Readings from Last 1 Encounters:   04/30/19 1.499 m (4' 11\")     Weight:    Wt Readings from Last 5 Encounters:   04/30/19 49.9 kg (110 lb)       BMI: 22.22    Diet Recall:  (some usual intake/meals):  Meal Food    Breakfast  dinner leftovers or an ache for half of an English muffin with peanut butter and a slice of banana or a slice of toast with butter and jelly or Greek yogurt and half English muffin with butter and jelly   Lunch  12 PM: General leftovers or soup and salad or BLT or turkey sandwich sometimes with fries or salad or a slice of pizza   Dinner  7:30 PM: Chicken/turkey/salmon/occasional burger with rice and vegetable or pasta with ground turkey onions, red " peppers, garlic and tomato sauce or chili with a side salad or tacos   Snacks  3 PM kids Z bar or mixed fruit cup or pineapple or biscotti, 1 piece of dark chocolate, popcorn occasionally and sometimes in the evening has a very small scoop of ice cream   Beverages  1 cup of herbal tea in the evening, half cup to 1 cup of coffee in a.m. 16 ounce or less water per day 1 cup lemonade with dinner, occasionally put sparkling water and lemonade and occasional unsweetened ice tea   Alcohol Intake  1 glass white line every couple of weeks (see about 2 drinks per month) he     Frequency of eating/taking out meals: 3-5 times per week (2-3 dinner meals and 1-2 lunch meals)     Labs:  On 19: Cholesterol 206. Others reviewed.  Pertinent Medications/vitamin and mineral supplements:  vitamin D, multivitamin daily, sometimes takes calcium but stated that the pills are large so does not consistently take on a daily basis.  Food Allergies:  NKFA  Physical Activity: Takes a yoga class once per week for 60 minutes, uses treadmill or elliptical for 20 minutes once a week.  Does yoga stretches every morning for her back.  In the nicer weather will take walks with her .  Estimated Nutrition Needs based on current body weight of [id number] calories (25-30 kcals/kg), 50g protein (1g/kg), ~1 ml/kcal or total fluids per MD.    Nutrition Diagnosis:    Food and nutrition related knowledge deficit as evidenced by lack of previous/complete recall of previous diet education for general healthful diet, sources of calcium, tips for adding in vegetables and fruits at meals as evidenced by pt report and interest in diet education.    Nutrition Prescription: Recommended general healthful diet    Nutrition Intervention:    Nutrition Education/Counseling:  Provided general healthful diet nutrition education with tips and suggestions. Reviewed food groups with some example foods in groups. Reviewed the difference between unsaturated and  saturated fats with recommendation to aim for choosing unsaturated fat over saturated fats. Explained reasoning for selecting unsaturated fats over saturated fats and reviewed some sources of both. Encouraged patient to eat adequate fruit and vegetable servings per day (at least 5 servings but explained recommendation to aim for 9-11 servings per day). Discussed adequate hydration/recommendations and encouraged pt to increase fluid to consistently drink 48 oz-64 oz (6-8 cups) per day. Told pt okay add lemon/lime to water or can flavor with cucumber slice or fresh herbs/fruit for example to naturally flavor water. Reviewed sources of calcium and vitamin D for bone health. Discussed some options for calcium supplements such as taking a chewable calcium or petite pill which may be better tolerated by patient and per her preferences.    Answered patient's questions. She verbalized understanding of education provided. Provided pt with list of goals below.     Educational Materials Provided:   Health Daily Nutrition plate method handout and nutrition care manual: high calcium food list and vitamin D food list.    Goals:  1. Increase water/herbal tea/fruit/vegetable infused water to drink at least 6-8 cups (48 oz-64 oz) per day.  2. Limit drinking lemonade at dinner (can have lemon infused water. Can try true lemon or true lime flavoring).  3. Consistently eat a balanced breakfast meals. Have a fruit and/or vegetable serving at breakfast and lunch each day.  4. Can try Bariatric Advantage calcium chewy bites. (discount code if ordering on their company site is UOFMINN). Otherwise can purchase on [employer]. [employer]rum also have a chewy calcium as well or citrucal has petite calcium pills.  5. Include 1-2 calcium rich foods (such as yogurt, fortified almond milk) each day.  6. Follow exercise specialist's recommendations for exercise and getting more steps in each day.  7. Practice more mindful eating (and avoid distractions  while eating such as computers, tv, phone, working).    Nutrition Monitoring and Evaluation: Will monitor adherence to nutrition recommendations at future RD visits.     Further Medical Nutrition Therapy:  Annual visits or PRN    Patient was encouraged to call/contact RD with any further questions.    Lucía Burger, MS, RD, LD

## 2019-04-30 NOTE — PROGRESS NOTES
[facility] Dermatology Note      Dermatology Problem List:  1.Skin Examination: 4/30/2019    Encounter Date: Apr 30, 2019    CC:  Chief Complaint   Patient presents with     Skin Check     Jennifer is here today for a skin check- Jennifer notes no areas of concern.          History of Present Illness:  Ms. Jennifer Iraheta is a 53 year old female who is new to the dermatology clinic present for a FBSE. Today the patient reports no areas of concern. Patient does not have a history of skin cancer, but does have a family history of skin cancer (Mother and Aunt, she is unsure of what type of skin cancer). At a young age patient denied applying sun screen, but as she got older she start applying sun screen regularly. The patient denies painful, itching, tingling or bleeding lesions unless otherwise noted.    Past Medical History:   There is no problem list on file for this patient.    Past Medical History:   Diagnosis Date     Cataract congenital    learned about cataract when I was a child     Scoliosis     incresing back pain has tried multiple methods for strengthening and relief  - eg yoga     Past Surgical History:   Procedure Laterality Date     COLONOSCOPY N/A 2017    no findings     HERNIA REPAIR  1972?    double hernia repair       Social History:   reports that she has never smoked. She has never used smokeless tobacco. She reports that she drinks alcohol. She reports that she does not use drugs.    Family History:  Family History   Problem Relation Age of Onset     Bone Cancer Maternal Grandmother         Passed away from cancer of the bone marrow     Skin Cancer Mother         located on her leg     Myocardial Infarction Sister         Emergency triple bypass (catheter induced)     Asthma Sister      Attention Deficit Disorder Son         Inattentive ADD     Hypertension Father      Thrombosis Paternal Half-Brother        Medications:  Current Outpatient Medications   Medication Sig Dispense  Refill     calcium carbonate (OS-BRENNA) 500 MG tablet Take 1 tablet by mouth 2 times daily       cholecalciferol ( ULTRA STRENGTH) 2000 units CAPS Take 2,000 Units by mouth       multivitamin w/minerals (THERA-VIT-M) tablet Take 1 tablet by mouth daily       Vitamin D, Cholecalciferol, 1000 units TABS Take 1 tablet by mouth         No Known Allergies    Review of Systems:  -Constitutional: The patient denies fatigue, fevers, chills, unintended weight loss, and night sweats.  -HEENT: Patient denies nonhealing oral sores.  -Skin: As above in HPI. No additional skin concerns.    Physical exam:  Vitals: There were no vitals taken for this visit.  GEN: This is a well developed, well-nourished female in no acute distress, in a pleasant mood.    SKIN: Full skin, which includes the head/face, both arms, chest, back, abdomen,both legs, genitalia and/or groin buttocks, digits and/or nails, was examined.  -Pritchard's skin type II, less than 100 Nevi   -There is a firm tan/flesh colored papule that dimples with lateral pressure on the right upper thigh.  -No other lesions of concern on areas examined.       Impression/Plan:  1. Skin examination     Educated the patient on the various Skin Cancers     ABCDs of melanoma were discussed and self skin checks were advised.     Sun precaution was advised including the use of sun screens of SPF 30 or higher, sun protective clothing, and avoidance of tanning beds.    Yearly skin examination due to the fact that the patient is above 50    2. Dermatofibroma    Reassured Benign, No further intervention required. Patient to report changes.     CC Dr. Nguyễn on close of this encounter.  Follow-up in 1 year, earlier for new or changing lesions.       Staff Involved:  Staff/Scribe    Scribe Disclosure:  Radha BRO, am serving as a scribe to document services personally performed by Eva Atkinosn PA-C, based on data collection and the provider's statements to me.     Provider  Disclosure:   The documentation recorded by the scribe accurately reflects the services I personally performed and the decisions made by me.    All risks, benefits and alternatives were discussed with patient.  Patient is in agreement and understands the assessment and plan.  All questions were answered.    Eva Atkinson PA-C  [facility]: Phone: [phone], Fax: [phone]

## 2019-04-30 NOTE — LETTER
4/30/2019       RE: Jennifer Iraheta  [address]  [facility] [address]     Dear Colleague,    Thank you for referring your patient, Jennifer Iraheta, to the [facility] at [facility]. Please see a copy of my visit note below.    Ms. Iraheta is a 53 year old female here for Executive Physical.    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 Overall, she is quite healthy. She had a few concerns which we reviewed today:    1. Sleep Maintenance Insomnia: Jennifer has difficulty staying asleep. She typically is quite tired and falls asleep immediately around 11:30 when she goes to bed. She often wakes up at 3-4 am.  She finds her mind thinking about things. She does good thinking and planning early in the AM. Sometimes she will go back to bed after.  She is not overly tired during the daytime and is able to function, but wishes she could get more sleep. She denies pain, restless legs, snoring or urinary frequency. She takes 5 mg of melatonin right before bed.  She tried low dose lorazepam and did not have adverse effects. She has also tried trazodone but did not like how it made her feel. She saw sleep psychology previously. She doesn't prefer to stay on a medication long term.    2. Low Back Pain: She has a history of scoliosis.  Does yoga and walking at home.  Will see orthopedics today for further evaluation.    3. Travel: Upcoming trip to [facility] in June for just over a week.  Will be in [facility] and [place]  Will be traveling to a University.  She previously went to [place] and has had typhoid and hep A vaccinations. She is not sure about Hep B.  Overall, she is curious about her risk for illness and is hesitant to receive vaccinations or take malaria prophylaxis.  We did review the CDC travelers recommendations.    4.  Anemia/Iron deficiency: Previously related to menorrhagia. Had an endometrial ablation in 2/17 and endometrial biopsy which was benign. She had her last menses in 9/18 and some spotting in January.  Sees an outside Gyn.  Reports having normal paps and had a pap last year.  Has had 3 pregnancies and 2 vaginal births.  No family history of gyn malignancy or breast cancer.    Answers for HPI/ROS submitted by the patient on 4/20/2019   If you checked off any problems, how difficult have these problems made it for you to do your work, take care of things at home, or get along with other people?: Not difficult at all  PHQ9 TOTAL SCORE: 5  DESIREE 7 TOTAL SCORE: 3  General Symptoms: No  Skin Symptoms: No  HENT Symptoms: No  EYE SYMPTOMS: No  HEART SYMPTOMS: No  LUNG SYMPTOMS: No  INTESTINAL SYMPTOMS: No  URINARY SYMPTOMS: No  GYNECOLOGIC SYMPTOMS: No  BREAST SYMPTOMS: No  SKELETAL SYMPTOMS: Yes  BLOOD SYMPTOMS: No  NERVOUS SYSTEM SYMPTOMS: No  MENTAL HEALTH SYMPTOMS: No  Back pain: Yes  Muscle aches: Yes  Neck pain: No  Swollen joints: No  Joint pain: No  Bone pain: No  Muscle cramps: No  Muscle weakness: No  Joint stiffness: No  Bone fracture: No      A full 10-pt Review of Systems was performed, verified and is negative except as documented in the HPI.  All health questionnaires were reviewed, verified and relevant information documented above.    Past Medical History:  Past Medical History:   Diagnosis Date     Anemia      Cataract congenital    learned about cataract when I was a child     Menorrhagia      Scoliosis     incresing back pain has tried multiple methods for strengthening and relief  - eg yoga       Past Surgical History:  Past Surgical History:   Procedure Laterality Date     COLONOSCOPY N/A 2017    no findings     HC HYSTEROSCOPY, SURGICAL; W/ ENDOMETRIAL ABLATION, ANY METHOD       HERNIA REPAIR  1972?    double hernia repair       Active Meds:  Current Outpatient Medications   Medication     calcium  "carbonate (OS-BRENNA) 500 MG tablet     cholecalciferol ( ULTRA STRENGTH) 2000 units CAPS     LORazepam (ATIVAN) 0.5 MG tablet     multivitamin w/minerals (THERA-VIT-M) tablet     Vitamin D, Cholecalciferol, 1000 units TABS     No current facility-administered medications for this visit.         Allergies:  Patient has no known allergies.    Family History:  family history includes Asthma in her sister; Atrial fibrillation in her mother; Attention Deficit Disorder in her son; Bone Cancer in her maternal grandmother; GERD in her brother; Hiatal hernia in her brother; Hypertension in her father; Myocardial Infarction (age of onset: 40) in her sister; Skin Cancer in her mother; Thrombosis in her paternal half-brother.    Social History:  Social History     Tobacco Use     Smoking status: Never Smoker     Smokeless tobacco: Never Used   Substance Use Topics     Alcohol use: Yes     Comment: Rarely, glass of wine twice per month on average     Drug use: No    for 30 years to ,   2 sons Oliver and Kelsey, 23 yo and 23 yo finishing college  Niece lives with her  2 cats, enjoys reading, walking, medication, yoga, cooking  Active in [employer]    Physical Exam:  Vitals: /75   Pulse 67   Temp 97.7  F (36.5  C) (Oral)   Resp 16   Ht 1.499 m (4' 11\")   Wt 49.9 kg (110 lb)   SpO2 99%   BMI 22.22 kg/m     Constitutional: Alert, oriented, pleasant, no acute distress  Head: Normocephalic, atraumatic  Eyes: Extra-ocular movements intact, pupils equally round and reactive bilaterally, no scleral icterus  ENT: Oropharynx clear, moist mucus membranes, good dentition  Neck: Supple, no lymphadenopathy, no thyromegaly  Cardiovascular: Regular rate and rhythm, no murmurs, rubs or gallops, peripheral pulses full/symmetric  Respiratory: Good air movement bilaterally, lungs clear, no wheezes/rales/rhonchi  Breasts: normal without suspicious masses, skin changes or axillary nodes, symmetric fibrous " changes bilaterally around 6 o'clock  GI: Abdomen soft, bowel sounds present, nondistended, nontender, no organomegaly or masses, no rebound/guarding  Musculoskeletal: No edema, normal muscle tone, normal gait  Neurologic: Alert and oriented, cranial nerves 2-12 intact, strength 5/5 throughout, sensation to light touch intact, reflexes 2+ bilaterally  Psychiatric: normal mentation, affect and mood    EKG: NSR, no acute changes, normal intervals      Assessment and Plan:  Jennifer was seen today for physical.    Diagnoses and all orders for this visit:    Visit for preventive health examination  Preventative health reviewed and updated, all available results reviewed/discussed during visit.  -     Cancel: *UA reflex to Microscopic  -     NUTRITION REFERRAL [7493]  -     HIV Antigen Antibody Combo [IWO5014]  -     DX Hip/Pelvis/Spine  -     EKG 12-lead, tracing only  -     Cancel: PFT General Lab Testing  -     Cancel: VO2Max Fitness Assessment  -     Lipid panel reflex to direct LDL Fasting  -     Vitamin D Deficiency  -     TSH  -     Cancel: Glucose  -     Creatinine  -     Cancel: Alkaline phosphatase  -     Cancel: ALT  -     CBC with platelets differential  -     Iron and iron binding capacity; Future  -     Iron and iron binding capacity  -     ALT; Future  -     Alkaline phosphatase; Future  -     Glucose; Future  -     VO2Max Fitness Assessment; Future  -     PFT General Lab Testing; Future  -     PFT General Lab Testing  -     VO2Max Fitness Assessment  -     Cancel: Glucose  -     Cancel: Alkaline phosphatase  -     Cancel: ALT    Scoliosis  Will see ortho today.  -     XR Spine Complete Scoliosis 2 Views    Travel advice encounter  Travel to [facility] [place], [place] and [place] areas. Reviewed in detail with patient and discussed rationale/risks/benefits for vaccinations and malaria chemoprophylaxis.  Advised we do not have Yellow Fever vaccination but advised she should consider typhoid and meningococcal today and  schedule f/u with [facility]. She elected to review this further and declined vaccinations today.  -     Hepatitis B Surface Antibody; Future    Insomnia, psychophysiological  Discussed strategies for sleep maintenance.  Suggested taking melatonin earlier. Suggested trial of OTC medication such as doxylamine.  Lorazepam to be reserved for travel or recalcitrant insomnia.  -     LORazepam (ATIVAN) 0.5 MG tablet; Take 0.5-1 tablets (0.25-0.5 mg) by mouth nightly as needed for sleep    Iron deficiency anemia due to chronic blood loss  Likely 2/2 menorrhagia, resolved with ablation.  Her iron today is normal.  Provided reassurance.        #Routine Health Maintenence:  Immunizations (zoster, pneumovax, flu, Tdap, Hep A/B):   Most Recent Immunizations   Administered Date(s) Administered     None   Pended Date(s) Pended     Meningococcal (Menveo ) 04/30/2019     Typhoid IM 04/30/2019     Lipids:   Recent Labs   Lab Test 04/30/19  0712   CHOL 206*   HDL 91   LDL 97   TRIG 87     Lung Ca Screening (>30 pk age 55-79 or >20 py age 50-79 + RF): n/a non smoker  Colonoscopy (50-75 yrs): 5/16, diverticulosis, f/u 10 years  Dexa (>65W or 70M yrs): done previously at [place] and today, results pending  Mammogram (40-75 yrs): 11/18 [facility] normal  Pap (21-65 yrs): 2018 per patient report, results unavailable  HIV/HCV if risk factors: HCV neg 12/14  Safety/Lifestyle: reviewed  Tob/EtOH: reviewed  Depression: PHQ-2 Score:     No flowsheet data found.    Advanced Directive: not discussed    Return to clinic: adelaida Molina MD  Internal Medicine                    Again, thank you for allowing me to participate in the care of your patient.      Sincerely,    Marie Molina MD

## 2019-04-30 NOTE — NURSING NOTE
"Reason For Visit:   Chief Complaint   Patient presents with     Consult     Scoliosis. Has been seen for Scoliosis before. Has daily low back pain.        Primary MD: [facility]    ?  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  Work status?  Full time.  Date of injury: None  Date of surgery:None  Smoker: No    Ht 1.499 m (4' 11\")   Wt 49.9 kg (110 lb)   BMI 22.22 kg/m      Pain Assessment  Patient Currently in Pain: Yes  0-10 Pain Scale: 2  Primary Pain Location: Back  Pain Descriptors: Discomfort      Visual Analog Pain Scale  Back Pain Scale 0-10: 1.5  Right leg pain: 0  Left leg pain: 0    Promis 10 Assessment    PROMIS 10 4/20/2019   In general, would you say your health is: Very good   In general, would you say your quality of life is: Excellent   In general, how would you rate your physical health? Very good   In general, how would you rate your mental health, including your mood and your ability to think? Excellent   In general, how would you rate your satisfaction with your social activities and relationships? Very good   In general, please rate how well you carry out your usual social activities and roles Very good   To what extent are you able to carry out your everyday physical activities such as walking, climbing stairs, carrying groceries, or moving a chair? Completely   How often have you been bothered by emotional problems such as feeling anxious, depressed or irritable? Rarely   How would you rate your fatigue on average? Mild   How would you rate your pain on average?   0 = No Pain  to  10 = Worst Imaginable Pain 3   In general, would you say your health is: 4   In general, would you say your quality of life is: 5   In general, how would you rate your physical health? 4   In general, how would you rate your mental health, including your mood and your ability to think? 5   In general, how would you rate your satisfaction " with your social activities and relationships? 4   In general, please rate how well you carry out your usual social activities and roles. (This includes activities at home, at work and in your community, and responsibilities as a parent, child, spouse, employee, friend, etc.) 4   To what extent are you able to carry out your everyday physical activities such as walking, climbing stairs, carrying groceries, or moving a chair? 5   In the past 7 days, how often have you been bothered by emotional problems such as feeling anxious, depressed, or irritable? 2   In the past 7 days, how would you rate your fatigue on average? 2   In the past 7 days, how would you rate your pain on average, where 0 means no pain, and 10 means worst imaginable pain? 3   Global Mental Health Score 18   Global Physical Health Score 17   PROMIS TOTAL - SUBSCORES 35                Katie Gonzalez LPN

## 2019-04-30 NOTE — LETTER
"4/30/2019       RE: Jennifer Iraheta  [address] [facility] [address]     Dear Colleague,    Thank you for referring your patient, Jennifer Iraheta, to the [facility] GASTROENTEROLOGY AND IBD CLIN[facility] at [facility]. Please see a copy of my visit note below.    [facility] Outpatient Medical Nutrition Therapy      Time Spent:  60 minutes  Session Type:  Individual Session  Referral Source: [facility]/Dr. Marie Molina  Reason for RD Visit:   Nutritional counseling     Nutrition Assessment:  Patient is here for annual visit with Registered Dietitian (RD).  Patient is a 53 y.o. female with history of sciolosis and cataracts.  Patient stated that she is interested in finding out about some calcium rich/containing foods due to history of osteopenia.  She stated that in general she eats a healthy diet but feels she could eat some more fruits and vegetables and does not drink a lot of fluids especially does not drink a lot of water.  She likes to cook but due to her busy schedule does not have a lot of time to cook.  Her  has been cooking dinner meals.  She works until 6pm in the evening and then visits her mother on her way home from work.  Therefore, she eats dinner at about 7:30 in the evening.  She stated that her usual weight since middle school has been about 105 pounds she reported some weight gain to 110 pounds, so she does not want to continue to gain weight and wants to be more mindful of watching her weight and habits that could cause weight gain.    Height:   Ht Readings from Last 1 Encounters:   04/30/19 1.499 m (4' 11\")     Weight:    Wt Readings from Last 5 Encounters:   04/30/19 49.9 kg (110 lb)       BMI: 22.22    Diet Recall:  (some usual intake/meals):  Meal Food    Breakfast  dinner leftovers or an ache for half of an English muffin with peanut butter and a slice of banana or a slice of toast with butter and jelly or Greek " yogurt and half English muffin with butter and jelly   Lunch  12 PM: General leftovers or soup and salad or BLT or turkey sandwich sometimes with fries or salad or a slice of pizza   Dinner  7:30 PM: Chicken/turkey/salmon/occasional burger with rice and vegetable or pasta with ground turkey onions, red peppers, garlic and tomato sauce or chili with a side salad or tacos   Snacks  3 PM kids Z bar or mixed fruit cup or pineapple or biscotti, 1 piece of dark chocolate, popcorn occasionally and sometimes in the evening has a very small scoop of ice cream   Beverages  1 cup of herbal tea in the evening, half cup to 1 cup of coffee in a.m. 16 ounce or less water per day 1 cup lemonade with dinner, occasionally put sparkling water and lemonade and occasional unsweetened ice tea   Alcohol Intake  1 glass white line every couple of weeks (see about 2 drinks per month) he     Frequency of eating/taking out meals: 3-5 times per week (2-3 dinner meals and 1-2 lunch meals)     Labs:  On 19: Cholesterol 206. Others reviewed.  Pertinent Medications/vitamin and mineral supplements:  vitamin D, multivitamin daily, sometimes takes calcium but stated that the pills are large so does not consistently take on a daily basis.  Food Allergies:  NKFA  Physical Activity: Takes a yoga class once per week for 60 minutes, uses treadmill or elliptical for 20 minutes once a week.  Does yoga stretches every morning for her back.  In the nicer weather will take walks with her .  Estimated Nutrition Needs based on current body weight of [id number] calories (25-30 kcals/kg), 50g protein (1g/kg), ~1 ml/kcal or total fluids per MD.    Nutrition Diagnosis:    Food and nutrition related knowledge deficit as evidenced by lack of previous/complete recall of previous diet education for general healthful diet, sources of calcium, tips for adding in vegetables and fruits at meals as evidenced by pt report and interest in diet  education.    Nutrition Prescription: Recommended general healthful diet    Nutrition Intervention:    Nutrition Education/Counseling:  Provided general healthful diet nutrition education with tips and suggestions. Reviewed food groups with some example foods in groups. Reviewed the difference between unsaturated and saturated fats with recommendation to aim for choosing unsaturated fat over saturated fats. Explained reasoning for selecting unsaturated fats over saturated fats and reviewed some sources of both. Encouraged patient to eat adequate fruit and vegetable servings per day (at least 5 servings but explained recommendation to aim for 9-11 servings per day). Discussed adequate hydration/recommendations and encouraged pt to increase fluid to consistently drink 48 oz-64 oz (6-8 cups) per day. Told pt okay add lemon/lime to water or can flavor with cucumber slice or fresh herbs/fruit for example to naturally flavor water. Reviewed sources of calcium and vitamin D for bone health. Discussed some options for calcium supplements such as taking a chewable calcium or petite pill which may be better tolerated by patient and per her preferences.    Answered patient's questions. She verbalized understanding of education provided. Provided pt with list of goals below.     Educational Materials Provided:   Health Daily Nutrition plate method handout and nutrition care manual: high calcium food list and vitamin D food list.    Goals:  1. Increase water/herbal tea/fruit/vegetable infused water to drink at least 6-8 cups (48 oz-64 oz) per day.  2. Limit drinking lemonade at dinner (can have lemon infused water. Can try true lemon or true lime flavoring).  3. Consistently eat a balanced breakfast meals. Have a fruit and/or vegetable serving at breakfast and lunch each day.  4. Can try Bariatric Advantage calcium chewy bites. (discount code if ordering on their company site is [employer]). Otherwise can purchase on amazon.  Centrum also have a chewy calcium as well or citrucal has petite calcium pills.  5. Include 1-2 calcium rich foods (such as yogurt, fortified almond milk) each day.  6. Follow exercise specialist's recommendations for exercise and getting more steps in each day.  7. Practice more mindful eating (and avoid distractions while eating such as computers, tv, phone, working).    Nutrition Monitoring and Evaluation: Will monitor adherence to nutrition recommendations at future RD visits.     Further Medical Nutrition Therapy:  Annual visits or PRN    Patient was encouraged to call/contact RD with any further questions.    Lucía Burger, MS, RD, LD

## 2019-04-30 NOTE — NURSING NOTE
AHA BP    1st    119/80  2nd   135/76  3rd    117/75    Average   124/77  Мария Kelly [facility] 7:52 AM on 4/30/2019.

## 2019-04-30 NOTE — LETTER
4/30/2019       RE: Jennifer Iraheta  [address]  [facility] [id number]     Dear Colleague,    Thank you for referring your patient, Jennifer Iraheta, to the [facility]. Please see a copy of my visit note below.    BMD Hx:  4/30/19 DEXA spine and hip ([facility]).  Lowest T-score = -2.8 (L1 vertebra).      REASON FOR CONSULTATION: Consult (Scoliosis. Has been seen for Scoliosis before. Has daily low back pain. )     REFERRING PHYSICIAN: Referred Self   PCP:[facility]    History of Present Illness: Patient is a 53 year old female, who reports a long history of noted scoliosis.  She was diagnosed as a child.  She never had a brace or therapy for this.  She noted some increase in low back pain about 24 years ago when pregnant with her first child.  Over the last 5 to 10 years the back pain has been increasing with activities.  She notes this mostly in her lower back, much less so in the thoracic region.  It will occasionally radiate to bilateral gluteals left side greater than right.  She occasionally will notice some radiation around to the anterior thigh on the right and very occasionally on the left.  She wakes up with some pain in the back and stiffness in the mornings.  She goes to yoga class once a week and does do independent stretching elliptical and treadmill work at home.  She works at [employer] and is still able to fulfill her requirements daily.  She has not had recent imaging here does report a prior x-ray of her back at [facility] in the past.      Back 100%, Leg 0%,  Right Only  Worse: sitting still, standing  Better: exercising and walking    Previous treatment:   She tried a couple sessions of physical therapy but was unable to work this into her schedule      Visual Analog Pain Scale  Back Pain Scale 0-10: 1.5  Right leg pain: 0  Left leg pain: 0    PROMIS-10 Scores  Global Mental Health Score:  (P) 18  Global Physical Health Score: (P) 17  PROMIS TOTAL - SUBSCORES: (P) 35    ROS:  A 12-point review of systems was completed and is negative except for otherwise noted above in the history of present illness.    Med Hx:  Past Medical History:   Diagnosis Date     Anemia      Cataract congenital    learned about cataract when I was a child     Menorrhagia      Scoliosis     incresing back pain has tried multiple methods for strengthening and relief  - eg yoga       Surg Hx:  Past Surgical History:   Procedure Laterality Date     COLONOSCOPY N/A 2017    no findings     [facility] HYSTEROSCOPY, SURGICAL; W/ ENDOMETRIAL ABLATION, ANY METHOD       HERNIA REPAIR  1972?    double hernia repair       Allergies:  No Known Allergies    Meds:  Current Outpatient Medications   Medication     calcium carbonate (OS-BRENNA) 500 MG tablet     cholecalciferol ( ULTRA STRENGTH) 2000 units CAPS     multivitamin w/minerals (THERA-VIT-M) tablet     Vitamin D, Cholecalciferol, 1000 units TABS     LORazepam (ATIVAN) 0.5 MG tablet     No current facility-administered medications for this visit.        Fam Hx:  Family History   Problem Relation Age of Onset     Bone Cancer Maternal Grandmother         Passed away from cancer of the bone marrow     Skin Cancer Mother         ?melanoma, located on her leg     Atrial fibrillation Mother         s/p pacer/ablation     Myocardial Infarction Sister 40        Emergency triple bypass (catheter induced)     Asthma Sister      Attention Deficit Disorder Son         Inattentive ADD     Hypertension Father      Thrombosis Paternal Half-Brother      Hiatal hernia Brother      GERD Brother        P/S Hx:  Social History     Tobacco Use     Smoking status: Never Smoker     Smokeless tobacco: Never Used   Substance Use Topics     Alcohol use: Yes     Comment: Rarely, glass of wine twice per month on average         Physical Exam:  Very pleasant, healthy appearing, alert, oriented x 3, cooperative.  Normal  "mood and affect.  Not in cardiorespiratory distress.  Ht 1.499 m (4' 11\")   Wt 49.9 kg (110 lb)   BMI 22.22 kg/m     Normal upright posture.    Normal gait without assistive device.  No antalgia / imbalance.  Able to walk on toes and on heels with ease.  Back: no deformity, no skin lesions or surgical scars.  Localizes pain at low back into bilateral gluteals.  Tenderness: (-) midline, (-) paraspinal, (-) R and L PSIS.  Tender to palpation at bilateral gluteals and moderate at bilateral trochanteric bursae.  ROM:   Full painless extension.   Full painless flexion, able to reach down to toes.     Neuro Exam:  Motor: 5/5 strength for all muscle groups in both LE's.  Sensory:  Intact to light touch in both LE's.   Reflexes:  Knee 3+ bilat.  Ankle 1+ bilat. (-) clonus.    Lower Extremity:  Equal leg lengths, full pulses, (-) atrophy / asymmetry.  Full painless passive knee and ankle motion.  Straight leg raise: (-) right, (-) left.  Hip impingement: (-) right, (-) left.            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  (+) adult idiopathic scoliosis with superimposed degenerative changes.  Coronal measurements as follows:  * Has only 11 thoracic vertebrae  L PT T1-T5 33  R MT T5-T9 41  L Lum T9-L4 30  C7 adilia 2.0 cm to L  R shoulder higher 2.0cm  Level pelvis    Lateral x-ray shows 5 lumbar vertebral bodies.  No significant sagittal malalignment.  Sagittal measurements as follows: \57  L4-S1 44, ideal 36  PI 54  PI-LL mismatch -3  PT 14  SVA -2.2cm  TPA 7  GT 9  T10-L2 kyph 13    No previous advanced imaging (MRI/CT).      Impression:   1.  Adult idiopathic scoliosis with superimposed degenerative scoliosis.  2.  Chronic mechanical low back and buttock pain L>R.  3.  Lateral listhesis L4-5.  4.  Asymmetric disc collapse with likely stenosis L>R L4-5 and L5-S1.      Plan:   We discussed her scoliosis.  She is not interested in any sort of surgical " approach at this time but would like to gain insight into how to address her pain and prevent her condition from worsening.  Suggested physical therapy for exercises to strengthen paraspinal muscles, at least 2-3 episodes with training for home exercise program.  If she is unimproved from this we will obtain an MRI to evaluate for potential nerve compression.  She can call without setting up another visit to get the MRI ordered.  Based on results of that imaging we may consider injection therapy, both diagnostic and therapeutic.  She will follow-up with primary care to decide how best to deal with her findings from her DEXA scan.  We did discuss various therapies and she will let us know what course of action is suggested.  RTC prn.    All questions and concerns were answered to the patient's apparent satisfaction before leaving the clinic.     Total visit time > 30 mins, > 50% counseling and coordination of care.    Thank you for allowing Dr Garza and myself to provide your care.    Jarrod Wen PA-C      Attestation:  I (Dr. Lamont Garza - Spine Surgeon) have personally evaluated patient with LINDA Wen, and agree with findings and plan outlined in the note, which I also edited.  I discussed at length with the patient/family, explained the nature of spinal condition, and formulated workup and/or treatment plan together.  All questions were answered to the best of my ability and to patient's apparent satisfaction.    Again, thank you for allowing me to participate in the care of your patient.      Sincerely,    Lamont Garza MD

## 2019-04-30 NOTE — PATIENT INSTRUCTIONS
Jennifer,    It was great to meet you today! Your current program of 1d/wk yoga and 1d/wk cardio is a good start. To increase your fitness, however, I would add 1-2d/wk weight training (try [place] times 7 minute workout) and get your cardio to 2d/wk. Make sure 1 of those cardio days pushes you to above 150 beats per minute for a few minutes before recovering with easy walking. Repeat that sequence 3-6 times.    I encourage you to look into getting an Apple watch or similar wearable device that can track your physical activity, heart rate, and exercise. It sounds like a similar device helped you in the past, so give it another go! In a similar vein, any way you can increase how much you walk per day would be great. Whether that's walking meetings or quick strolls around the office once every 60-90 minutes, they add up.    Cornel

## 2019-04-30 NOTE — PROGRESS NOTES
BMD Hx:  4/30/19 DEXA spine and hip ([facility]).  Lowest T-score = -2.8 (L1 vertebra).      REASON FOR CONSULTATION: Consult (Scoliosis. Has been seen for Scoliosis before. Has daily low back pain. )     REFERRING PHYSICIAN: Referred Self   PCP:[facility]    History of Present Illness: Patient is a 53 year old female, who reports a long history of noted scoliosis.  She was diagnosed as a child.  She never had a brace or therapy for this.  She noted some increase in low back pain about 24 years ago when pregnant with her first child.  Over the last 5 to 10 years the back pain has been increasing with activities.  She notes this mostly in her lower back, much less so in the thoracic region.  It will occasionally radiate to bilateral gluteals left side greater than right.  She occasionally will notice some radiation around to the anterior thigh on the right and very occasionally on the left.  She wakes up with some pain in the back and stiffness in the mornings.  She goes to yoga class once a week and does do independent stretching elliptical and treadmill work at home.  She works at [employer] and is still able to fulfill her requirements daily.  She has not had recent imaging here does report a prior x-ray of her back at [facility] in the past.      Back 100%, Leg 0%,  Right Only  Worse: sitting still, standing  Better: exercising and walking    Previous treatment:   She tried a couple sessions of physical therapy but was unable to work this into her schedule      Visual Analog Pain Scale  Back Pain Scale 0-10: 1.5  Right leg pain: 0  Left leg pain: 0    PROMIS-10 Scores  Global Mental Health Score: (P) 18  Global Physical Health Score: (P) 17  PROMIS TOTAL - SUBSCORES: (P) 35    ROS:  A 12-point review of systems was completed and is negative except for otherwise noted above in the history of present illness.    Med Hx:  Past Medical History:   Diagnosis Date     Anemia      Cataract  "congenital    learned about cataract when I was a child     Menorrhagia      Scoliosis     incresing back pain has tried multiple methods for strengthening and relief  - eg yoga       Surg Hx:  Past Surgical History:   Procedure Laterality Date     COLONOSCOPY N/A 2017    no findings     [facility] HYSTEROSCOPY, SURGICAL; W/ ENDOMETRIAL ABLATION, ANY METHOD       HERNIA REPAIR  1972?    double hernia repair       Allergies:  No Known Allergies    Meds:  Current Outpatient Medications   Medication     calcium carbonate (OS-BRENNA) 500 MG tablet     cholecalciferol ( ULTRA STRENGTH) 2000 units CAPS     multivitamin w/minerals (THERA-VIT-M) tablet     Vitamin D, Cholecalciferol, 1000 units TABS     LORazepam (ATIVAN) 0.5 MG tablet     No current facility-administered medications for this visit.        Fam Hx:  Family History   Problem Relation Age of Onset     Bone Cancer Maternal Grandmother         Passed away from cancer of the bone marrow     Skin Cancer Mother         ?melanoma, located on her leg     Atrial fibrillation Mother         s/p pacer/ablation     Myocardial Infarction Sister 40        Emergency triple bypass (catheter induced)     Asthma Sister      Attention Deficit Disorder Son         Inattentive ADD     Hypertension Father      Thrombosis Paternal Half-Brother      Hiatal hernia Brother      GERD Brother        P/S Hx:  Social History     Tobacco Use     Smoking status: Never Smoker     Smokeless tobacco: Never Used   Substance Use Topics     Alcohol use: Yes     Comment: Rarely, glass of wine twice per month on average         Physical Exam:  Very pleasant, healthy appearing, alert, oriented x 3, cooperative.  Normal mood and affect.  Not in cardiorespiratory distress.  Ht 1.499 m (4' 11\")   Wt 49.9 kg (110 lb)   BMI 22.22 kg/m    Normal upright posture.    Normal gait without assistive device.  No antalgia / imbalance.  Able to walk on toes and on heels with ease.  Back: no deformity, no skin lesions " or surgical scars.  Localizes pain at low back into bilateral gluteals.  Tenderness: (-) midline, (-) paraspinal, (-) R and L PSIS.  Tender to palpation at bilateral gluteals and moderate at bilateral trochanteric bursae.  ROM:   Full painless extension.   Full painless flexion, able to reach down to toes.     Neuro Exam:  Motor: 5/5 strength for all muscle groups in both LE's.  Sensory:  Intact to light touch in both LE's.   Reflexes:  Knee 3+ bilat.  Ankle 1+ bilat. (-) clonus.    Lower Extremity:  Equal leg lengths, full pulses, (-) atrophy / asymmetry.  Full painless passive knee and ankle motion.  Straight leg raise: (-) right, (-) left.  Hip impingement: (-) right, (-) left.            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  (+) adult idiopathic scoliosis with superimposed degenerative changes.  Coronal measurements as follows:  * Has only 11 thoracic vertebrae  L PT T1-T5 33  R MT T5-T9 41  L Lum T9-L4 30  C7 adilia 2.0 cm to L  R shoulder higher 2.0cm  Level pelvis    Lateral x-ray shows 5 lumbar vertebral bodies.  No significant sagittal malalignment.  Sagittal measurements as follows: \  LL 57  L4-S1 44, ideal 36  PI 54  PI-LL mismatch -3  PT 14  SVA -2.2cm  TPA 7  GT 9  T10-L2 kyph 13    No previous advanced imaging (MRI/CT).      Impression:   1.  Adult idiopathic scoliosis with superimposed degenerative scoliosis.  2.  Chronic mechanical low back and buttock pain L>R.  3.  Lateral listhesis L4-5.  4.  Asymmetric disc collapse with likely stenosis L>R L4-5 and L5-S1.      Plan:   We discussed her scoliosis.  She is not interested in any sort of surgical approach at this time but would like to gain insight into how to address her pain and prevent her condition from worsening.  Suggested physical therapy for exercises to strengthen paraspinal muscles, at least 2-3 episodes with training for home exercise program.  If she is unimproved from  this we will obtain an MRI to evaluate for potential nerve compression.  She can call without setting up another visit to get the MRI ordered.  Based on results of that imaging we may consider injection therapy, both diagnostic and therapeutic.  She will follow-up with primary care to decide how best to deal with her findings from her DEXA scan.  We did discuss various therapies and she will let us know what course of action is suggested.  RTC prn.    All questions and concerns were answered to the patient's apparent satisfaction before leaving the clinic.     Total visit time > 30 mins, > 50% counseling and coordination of care.    Thank you for allowing Dr Garza and myself to provide your care.    Jarrod Wen PA-C      Attestation:  I (Dr. Lamont Garza - Spine Surgeon) have personally evaluated patient with LINDA Wen, and agree with findings and plan outlined in the note, which I also edited.  I discussed at length with the patient/family, explained the nature of spinal condition, and formulated workup and/or treatment plan together.  All questions were answered to the best of my ability and to patient's apparent satisfaction.      Lamont Garza MD    Orthopaedic Spine Surgery  Dept Orthopaedic Surgery, [facility]  [phone] office, [phone] pager  [email or web address]

## 2019-04-30 NOTE — OUTPATIENT NURSE NOTE
04/30/19 1311   Fitness   Current Fitness Regimen:  Yoga 1d/wk 60 min. Treadmill or elliptical 1d/wk for 20 min. Heart rate ~ 140 for elliptical, power walk program on treadmill. Morning stretches for back   Fitness Goals Reduce back pain, improve fitness   Timeline   Recommended Activity this Week Continue current, but incorporate more steps per day. Try regular, scheduled breaks to get away from the computer about once an hour for even a couple minutes at a time.   Recommended Minutes per Day this Week 30 Min   Recommended Number of Days this Week 2 Per Day/Per Week   Recommended Activity this Month As above, but increase cardio to 2d/wk.   Recommended Duration this Month 30 Min/Hrs   Recommended Frequency this Month 3 Per Day/Per Week   Recommended Activity the Nex 3 Months As above, but add 1-2 d/wk weight training. Begin with [place] times 7 minute workout. Eventually add heavy weight lifting. Increase cardio sessions to at least 30 minutes. 1 cardio day should be intense intervals (heart rate > 150 for 2-3 minutes followed by ~3 minutes of easy walking to recover)   Recommended Duration the Nex 3 Months 30 Min/Hrs   Recommended Frequency the Nex 3 Months:  4 Per Day/Per Week   VO2 Max   VO2MAX:  27.5 ml/kg/min   VO2- max Percentile 20 %   Fitness Level Poor    Strength    Strength (Right):  56.1 ml/kg/min    Strength (Left) 51.6 ml/kg/min

## 2019-04-30 NOTE — PATIENT INSTRUCTIONS
Try melatonin--take 3-4 hours before bedtime.    Unisom/doxylamine--over the counter--1/3-1/2 tablet before bed to see if helps you stay asleep.    Lorazepam--prescription strength--try at home first. Okay to use for travel/plane or rare nights when really can't sleep.  Try 0.5 mg or 0.25 mg.    For travel to [facility], consider:  -Typhoid vaccine  -*Meningitis vaccine  -*Yellow Fever vaccine (needs to be done through a Travel Clinic)  -Hepatitis B (will check blood status)  -*Malaria medication

## 2019-04-30 NOTE — NURSING NOTE
Chief Complaint   Patient presents with     Physical     Patient is here for annual physical     Мария Kelly CMA 6:54 AM on 4/30/2019.

## 2019-05-01 LAB
EXPTIME-PRE: 7.16 SEC
FEF2575-%PRED-PRE: 112 %
FEF2575-PRE: 2.56 L/SEC
FEF2575-PRED: 2.27 L/SEC
FEFMAX-%PRED-PRE: 113 %
FEFMAX-PRE: 6.64 L/SEC
FEFMAX-PRED: 5.84 L/SEC
FEV1-%PRED-PRE: 97 %
FEV1-PRE: 2.18 L
FEV1FEV6-PRE: 85 %
FEV1FEV6-PRED: 82 %
FEV1FVC-PRE: 86 %
FEV1FVC-PRED: 81 %
FIFMAX-PRE: 3.63 L/SEC
FVC-%PRED-PRE: 90 %
FVC-PRE: 2.53 L
FVC-PRED: 2.78 L
INTERPRETATION ECG - MUSE: NORMAL

## 2019-05-10 ENCOUNTER — TELEPHONE (OUTPATIENT)
Dept: INTERNAL MEDICINE | Facility: CLINIC | Age: 54
End: 2019-05-10

## 2019-05-10 ENCOUNTER — MYC MEDICAL ADVICE (OUTPATIENT)
Dept: INTERNAL MEDICINE | Facility: CLINIC | Age: 54
End: 2019-05-10

## 2019-05-10 DIAGNOSIS — T75.3XXS MOTION SICKNESS, SEQUELA: Primary | ICD-10-CM

## 2019-05-10 RX ORDER — SCOLOPAMINE TRANSDERMAL SYSTEM 1 MG/1
1 PATCH, EXTENDED RELEASE TRANSDERMAL
Qty: 4 PATCH | Refills: 1 | Status: SHIPPED | OUTPATIENT
Start: 2019-05-10 | End: 2019-06-18

## 2019-05-10 RX ORDER — MECLIZINE HYDROCHLORIDE 25 MG/1
12.5-25 TABLET ORAL 3 TIMES DAILY PRN
Qty: 10 TABLET | Refills: 1 | Status: SHIPPED | OUTPATIENT
Start: 2019-05-10

## 2019-06-16 ENCOUNTER — MYC MEDICAL ADVICE (OUTPATIENT)
Dept: INTERNAL MEDICINE | Facility: CLINIC | Age: 54
End: 2019-06-16

## 2019-06-16 DIAGNOSIS — Z86.19 HX OF TRAVELER'S DIARRHEA: Primary | ICD-10-CM

## 2019-06-16 DIAGNOSIS — T75.3XXS MOTION SICKNESS, SEQUELA: ICD-10-CM

## 2019-06-18 RX ORDER — SCOLOPAMINE TRANSDERMAL SYSTEM 1 MG/1
1 PATCH, EXTENDED RELEASE TRANSDERMAL
Qty: 10 PATCH | Refills: 2 | Status: SHIPPED | OUTPATIENT
Start: 2019-06-18

## 2019-06-18 RX ORDER — AZITHROMYCIN 250 MG/1
1000 TABLET, FILM COATED ORAL DAILY
Qty: 4 TABLET | Refills: 0 | Status: SHIPPED | OUTPATIENT
Start: 2019-06-18 | End: 2019-06-19

## 2020-03-10 ENCOUNTER — HEALTH MAINTENANCE LETTER (OUTPATIENT)
Age: 55
End: 2020-03-10

## 2020-12-27 ENCOUNTER — HEALTH MAINTENANCE LETTER (OUTPATIENT)
Age: 55
End: 2020-12-27

## 2021-01-15 ENCOUNTER — HEALTH MAINTENANCE LETTER (OUTPATIENT)
Age: 56
End: 2021-01-15

## 2021-05-24 ENCOUNTER — RECORDS - HEALTHEAST (OUTPATIENT)
Dept: ADMINISTRATIVE | Facility: CLINIC | Age: 56
End: 2021-05-24

## 2021-05-25 ENCOUNTER — RECORDS - HEALTHEAST (OUTPATIENT)
Dept: ADMINISTRATIVE | Facility: CLINIC | Age: 56
End: 2021-05-25

## 2021-05-26 ENCOUNTER — RECORDS - HEALTHEAST (OUTPATIENT)
Dept: ADMINISTRATIVE | Facility: CLINIC | Age: 56
End: 2021-05-26

## 2021-07-13 ENCOUNTER — RECORDS - HEALTHEAST (OUTPATIENT)
Dept: ADMINISTRATIVE | Facility: CLINIC | Age: 56
End: 2021-07-13

## 2021-07-21 ENCOUNTER — RECORDS - HEALTHEAST (OUTPATIENT)
Dept: ADMINISTRATIVE | Facility: CLINIC | Age: 56
End: 2021-07-21

## 2021-10-09 ENCOUNTER — HEALTH MAINTENANCE LETTER (OUTPATIENT)
Age: 56
End: 2021-10-09

## 2022-01-29 ENCOUNTER — HEALTH MAINTENANCE LETTER (OUTPATIENT)
Age: 57
End: 2022-01-29

## 2022-09-11 ENCOUNTER — HEALTH MAINTENANCE LETTER (OUTPATIENT)
Age: 57
End: 2022-09-11

## 2023-01-23 ENCOUNTER — HEALTH MAINTENANCE LETTER (OUTPATIENT)
Age: 58
End: 2023-01-23

## 2023-05-06 ENCOUNTER — HEALTH MAINTENANCE LETTER (OUTPATIENT)
Age: 58
End: 2023-05-06